# Patient Record
(demographics unavailable — no encounter records)

---

## 2024-10-29 NOTE — RESULTS/DATA
[TextEntry] : EKG NSR NORMAL   ECHO 1. Left ventricular cavity is normal in size. Left ventricular wall thickness is normal. Left ventricular systolic function is normal with an ejection fraction of 62 % by Gonzales's method of disks. 2. Trace mitral regurgitation. 3. Mild tricuspid regurgitation. 4. Trace pulmonic regurgitation.  STRESS TEST  1. The ECG is negative for ischemia. Stress Test Results: Protocol: Standard [place] METS Achieved: 12.1 Stage Reached: 3 Exercise Duration: 10 min and 0 sec. Heart Rate: Resting 77 bpm, Stress peak 167 bpm (90 % max predicted) HR Response: Normal. Blood Pressure: Resting 128/84 mmHg, Stress max 144/78 mmHg BP Response: Normal. Exercise Capacity: Good. Pretest Chest Pain: None. Stress Test Chest Pain: no chest pain during exercise Symptoms During Stress: Fatigue. Reason for Termination: Fatigue and target heart rate achieved.  24 HR BP MONITOR DIASTOLIC HYPERTENSION WITHOUT DIP DURING SLEEP

## 2024-10-29 NOTE — HISTORY OF PRESENT ILLNESS
[FreeTextEntry1] : 35 y.o. female. BP had been running high. 24 hr BP monitor with diastolic hypertension.

## 2024-10-29 NOTE — DISCUSSION/SUMMARY
[FreeTextEntry1] : DIET DISCUSSED IN DETAIL AEROBIC EXERCISE ENCOURAGED START AMLODIPINE 2.5mg DAILY FOLLOWUP WITH PMD FOLLOWUP IN 6 WEEKS TO CHECK BP [EKG obtained to assist in diagnosis and management of assessed problem(s)] : EKG obtained to assist in diagnosis and management of assessed problem(s)

## 2024-12-26 NOTE — HISTORY OF PRESENT ILLNESS
[FreeTextEntry1] : 35 y.o. female. BP had been running high. 24 hr BP monitor with diastolic hypertension. Started on Amlodipine 2.5mg daily and BP is better. English

## 2024-12-26 NOTE — DISCUSSION/SUMMARY
[FreeTextEntry1] : DIET DISCUSSED IN DETAIL AEROBIC EXERCISE ENCOURAGED CONITNUE AMLODIPINE 2.5mg DAILY FOLLOWUP WITH PMD FOLLOWUP IN 4-6 MONTHS

## 2025-07-21 NOTE — CARDIOLOGY SUMMARY
[de-identified] : 7/21/25: sinus bradycardia [de-identified] : 9/13/24: Normal left and right ventricular size and systolic function. No significant valve disease.

## 2025-07-21 NOTE — HISTORY OF PRESENT ILLNESS
[FreeTextEntry1] : ALMAZ MCCABE is a 36 year old woman with HTN who presents for follow up today. She was a patient of Dr. Saucedo.   The patient denies chest pain, shortness of breath, palpitations and syncope. No leg swelling, orthopnea and PND.  She was diagnosed with HTN in 2024 when an ambulatory blood pressure monitor showed elevated DBP. She did not have a secondary HTN work up.

## 2025-07-21 NOTE — ASSESSMENT
[FreeTextEntry1] : 36 year old woman with HTN who presents for follow up today.  1. HTN: BP at goal on repeat. Her goal is <130/80 mmHg.  - Continue Amlodipine 2.5mg daily - Secondary HTN work up: echo normal, sent for blood work. Will discuss renal duplex at next visit. She does not snore.   The primary prevention of heart disease was discussed in detail with the patient, including adhering to a heart healthy, plant based, or Mediterranean diet, and the importance of 30 minutes of moderate intensity activity for 30 minutes, 5 times a week. All the patient's questions were answered.  RTC in 6 months.